# Patient Record
Sex: FEMALE | Race: WHITE | NOT HISPANIC OR LATINO | Employment: OTHER | URBAN - METROPOLITAN AREA
[De-identification: names, ages, dates, MRNs, and addresses within clinical notes are randomized per-mention and may not be internally consistent; named-entity substitution may affect disease eponyms.]

---

## 2017-03-18 ENCOUNTER — PREPPED CHART (OUTPATIENT)
Dept: URBAN - METROPOLITAN AREA CLINIC 35 | Facility: CLINIC | Age: 52
End: 2017-03-18

## 2018-06-18 ASSESSMENT — KERATOMETRY
OS_AXISANGLE2_DEGREES: 063
OD_K1POWER_DIOPTERS: 43.50
OS_K1POWER_DIOPTERS: 44.00
OD_K2POWER_DIOPTERS: 44.50
OD_AXISANGLE2_DEGREES: 106
OS_K2POWER_DIOPTERS: 44.50

## 2018-06-18 ASSESSMENT — VISUAL ACUITY
OD_SC: CF 6FT
OS_SC: J12
OS_CC: 20/50
OD_CC: J1
OD_SC: J12
OD_CC: 20/20
OS_CC: J6
OS_SC: CF 6FT

## 2018-06-18 ASSESSMENT — TONOMETRY
OD_IOP_MMHG: 16
OS_IOP_MMHG: 14

## 2018-06-25 ENCOUNTER — CONTACT LENS EXAM ESTABLISHED (OUTPATIENT)
Dept: URBAN - METROPOLITAN AREA CLINIC 35 | Facility: CLINIC | Age: 53
End: 2018-06-25

## 2018-06-25 DIAGNOSIS — H52.201: ICD-10-CM

## 2018-06-25 DIAGNOSIS — H52.11: ICD-10-CM

## 2018-06-25 DIAGNOSIS — H52.4: ICD-10-CM

## 2018-06-25 DIAGNOSIS — H44.22: ICD-10-CM

## 2018-06-25 PROCEDURE — 92014 COMPRE OPH EXAM EST PT 1/>: CPT

## 2018-06-25 PROCEDURE — 92310-1 LEVEL 1 CONTACT LENS MANAGEMENT

## 2018-06-25 PROCEDURE — 92015 DETERMINE REFRACTIVE STATE: CPT

## 2018-06-25 ASSESSMENT — KERATOMETRY
OS_AXISANGLE2_DEGREES: 063
OS_K1POWER_DIOPTERS: 44.00
OS_K2POWER_DIOPTERS: 44.50
OD_AXISANGLE2_DEGREES: 106
OD_K1POWER_DIOPTERS: 43.50
OD_K2POWER_DIOPTERS: 44.50

## 2018-06-25 ASSESSMENT — TONOMETRY
OS_IOP_MMHG: 15
OD_IOP_MMHG: 14

## 2019-03-28 ENCOUNTER — CONTACT LENS EXAM ESTABLISHED (OUTPATIENT)
Dept: URBAN - METROPOLITAN AREA CLINIC 35 | Facility: CLINIC | Age: 54
End: 2019-03-28

## 2019-03-28 DIAGNOSIS — H52.11: ICD-10-CM

## 2019-03-28 DIAGNOSIS — H52.201: ICD-10-CM

## 2019-03-28 DIAGNOSIS — H40.013: ICD-10-CM

## 2019-03-28 DIAGNOSIS — H52.4: ICD-10-CM

## 2019-03-28 DIAGNOSIS — H52.12: ICD-10-CM

## 2019-03-28 PROCEDURE — 76514 ECHO EXAM OF EYE THICKNESS: CPT

## 2019-03-28 PROCEDURE — 92310-2 LEVEL 2 CONTACT LENS MANAGEMENT

## 2019-03-28 PROCEDURE — 92133 CPTRZD OPH DX IMG PST SGM ON: CPT

## 2019-03-28 PROCEDURE — 92015 DETERMINE REFRACTIVE STATE: CPT

## 2019-03-28 PROCEDURE — 92014 COMPRE OPH EXAM EST PT 1/>: CPT

## 2019-03-28 ASSESSMENT — VISUAL ACUITY
OS_PH: 20/40
OD_CC: 20/25
OS_CC: 20/100
OD_CC: J1+
OS_CC: J8

## 2019-03-28 ASSESSMENT — KERATOMETRY
OD_AXISANGLE2_DEGREES: 106
OS_AXISANGLE2_DEGREES: 063
OS_K1POWER_DIOPTERS: 44.00
OD_K2POWER_DIOPTERS: 44.50
OD_K1POWER_DIOPTERS: 43.50
OS_K2POWER_DIOPTERS: 44.50

## 2019-03-28 ASSESSMENT — TONOMETRY
OD_IOP_MMHG: 15
OS_IOP_MMHG: 14

## 2019-05-01 ENCOUNTER — CONTACT LENS FOLLOW UP (OUTPATIENT)
Dept: URBAN - METROPOLITAN AREA CLINIC 35 | Facility: CLINIC | Age: 54
End: 2019-05-01

## 2019-05-01 DIAGNOSIS — H52.4: ICD-10-CM

## 2019-05-01 DIAGNOSIS — H40.013: ICD-10-CM

## 2019-05-01 DIAGNOSIS — H52.11: ICD-10-CM

## 2019-05-01 DIAGNOSIS — H52.12: ICD-10-CM

## 2019-05-01 PROCEDURE — 92310F

## 2019-05-01 ASSESSMENT — KERATOMETRY
OS_K2POWER_DIOPTERS: 44.50
OS_K1POWER_DIOPTERS: 44.00
OS_AXISANGLE2_DEGREES: 063
OD_K2POWER_DIOPTERS: 44.50
OD_AXISANGLE2_DEGREES: 106
OD_K1POWER_DIOPTERS: 43.50

## 2019-05-01 ASSESSMENT — VISUAL ACUITY
OD_CC: J3-
OD_CC: 20/30
OS_CC: 20/30+2
OS_CC: J2

## 2019-09-30 ENCOUNTER — EST. PATIENT EMERGENCY (OUTPATIENT)
Dept: URBAN - METROPOLITAN AREA CLINIC 38 | Facility: CLINIC | Age: 54
End: 2019-09-30

## 2019-09-30 DIAGNOSIS — H04.123: ICD-10-CM

## 2019-09-30 DIAGNOSIS — H02.9: ICD-10-CM

## 2019-09-30 PROCEDURE — 92012 INTRM OPH EXAM EST PATIENT: CPT

## 2019-09-30 RX ORDER — NEOMYCIN SULFATE, POLYMYXIN B SULFATE AND DEXAMETHASONE 3.5; 10000; 1 MG/G; [USP'U]/G; MG/G: OINTMENT OPHTHALMIC

## 2019-09-30 ASSESSMENT — KERATOMETRY
OS_AXISANGLE2_DEGREES: 063
OD_K1POWER_DIOPTERS: 43.50
OS_K1POWER_DIOPTERS: 44.00
OD_AXISANGLE2_DEGREES: 106
OD_K2POWER_DIOPTERS: 44.50
OS_K2POWER_DIOPTERS: 44.50

## 2019-09-30 ASSESSMENT — VISUAL ACUITY
OS_CC: J2
OD_CC: 20/25
OD_CC: J1
OS_CC: 20/40-3

## 2019-09-30 ASSESSMENT — TONOMETRY
OD_IOP_MMHG: 16
OS_IOP_MMHG: 17

## 2021-10-04 NOTE — PATIENT DISCUSSION
Recommend OBSERVATION and continued MONITORING for new tear/break/retinal detachment. headache/chest pain

## 2023-10-17 ENCOUNTER — CONSULTATION/EVALUATION (OUTPATIENT)
Dept: URBAN - METROPOLITAN AREA CLINIC 39 | Facility: CLINIC | Age: 58
End: 2023-10-17

## 2023-10-17 DIAGNOSIS — H04.123: ICD-10-CM

## 2023-10-17 DIAGNOSIS — H02.9: ICD-10-CM

## 2023-10-17 DIAGNOSIS — H40.013: ICD-10-CM

## 2023-10-17 DIAGNOSIS — H05.011: ICD-10-CM

## 2023-10-17 PROCEDURE — 99204 OFFICE O/P NEW MOD 45 MIN: CPT

## 2023-10-17 RX ORDER — BUSPIRONE HYDROCHLORIDE 10 MG/1: 1 TABLET ORAL

## 2023-10-17 ASSESSMENT — VISUAL ACUITY
OD_CC: 20/20-1
OS_CC: 20/20-1

## 2023-10-27 ENCOUNTER — FOLLOW UP (OUTPATIENT)
Dept: URBAN - METROPOLITAN AREA CLINIC 39 | Facility: CLINIC | Age: 58
End: 2023-10-27

## 2023-10-27 DIAGNOSIS — B02.39: ICD-10-CM

## 2023-10-27 DIAGNOSIS — H04.123: ICD-10-CM

## 2023-10-27 DIAGNOSIS — H40.013: ICD-10-CM

## 2023-10-27 PROCEDURE — 99213 OFFICE O/P EST LOW 20 MIN: CPT

## 2023-10-27 RX ORDER — BUSPIRONE HYDROCHLORIDE 10 MG/1: 1 TABLET ORAL ONCE A DAY

## 2023-10-27 RX ORDER — LOTEPREDNOL ETABONATE 3.8 MG/G: 1 GEL OPHTHALMIC

## 2023-10-27 ASSESSMENT — TONOMETRY
OD_IOP_MMHG: 13
OS_IOP_MMHG: 16

## 2023-10-27 ASSESSMENT — VISUAL ACUITY
OS_CC: 20/15
OD_CC: 20/20-2

## 2023-11-14 ENCOUNTER — FOLLOW UP (OUTPATIENT)
Dept: URBAN - METROPOLITAN AREA CLINIC 39 | Facility: CLINIC | Age: 58
End: 2023-11-14

## 2023-11-14 DIAGNOSIS — B02.39: ICD-10-CM

## 2023-11-14 DIAGNOSIS — H40.013: ICD-10-CM

## 2023-11-14 DIAGNOSIS — H04.123: ICD-10-CM

## 2023-11-14 PROCEDURE — 99212 OFFICE O/P EST SF 10 MIN: CPT

## 2023-11-14 ASSESSMENT — TONOMETRY
OD_IOP_MMHG: 18
OS_IOP_MMHG: 18

## 2023-11-14 ASSESSMENT — VISUAL ACUITY
OD_CC: 20/20+2
OS_CC: 20/20+1

## 2025-01-27 ENCOUNTER — CONSULTATION/EVALUATION (OUTPATIENT)
Age: 60
End: 2025-01-27

## 2025-01-27 DIAGNOSIS — B02.39: ICD-10-CM

## 2025-01-27 DIAGNOSIS — H40.013: ICD-10-CM

## 2025-01-27 DIAGNOSIS — H04.123: ICD-10-CM

## 2025-01-27 PROCEDURE — 92134 CPTRZ OPH DX IMG PST SGM RTA: CPT | Mod: NC

## 2025-01-27 PROCEDURE — 92025 CPTRIZED CORNEAL TOPOGRAPHY: CPT | Mod: NC

## 2025-01-27 PROCEDURE — 99214 OFFICE O/P EST MOD 30 MIN: CPT

## 2025-01-27 RX ORDER — OLOPATADINE HYDROCHLORIDE 2 MG/ML: 1 SOLUTION OPHTHALMIC TWICE A DAY

## 2025-01-27 RX ORDER — CYCLOSPORINE 0 MG/ML: 1 SOLUTION/ DROPS OPHTHALMIC; TOPICAL TWICE A DAY
